# Patient Record
(demographics unavailable — no encounter records)

---

## 2025-03-26 NOTE — PHYSICAL EXAM
[No Respiratory Distress] : no respiratory distress [de-identified] : Pt nonverbal, no acute distress [de-identified] : soft, no tenderness elicited, PEG-J in place, nondistended [General Appearance - In No Acute Distress] : in no acute distress [Sclera] : the sclera and conjunctiva were normal [Neck Cervical Mass (___cm)] : no neck mass was observed [Exaggerated Use Of Accessory Muscles For Inspiration] : no accessory muscle use [Auscultation Breath Sounds / Voice Sounds] : lungs were clear to auscultation bilaterally [Heart Sounds] : normal S1 and S2 [Bowel Sounds] : normal bowel sounds [Abdomen Tenderness] : non-tender [] : no hepato-splenomegaly [FreeTextEntry1] : J-tube and  G-tube noted with clean site [No CVA Tenderness] : no ~M costovertebral angle tenderness [Skin Color & Pigmentation] : normal skin color and pigmentation

## 2025-03-26 NOTE — REVIEW OF SYSTEMS
[Fever] : no fever [Recent Weight Gain (___ Lbs)] : no recent weight gain [Recent Weight Loss (___ Lbs)] : no recent weight loss [Eye Pain] : no eye pain [Earache] : no earache [Heart Rate Is Slow] : the heart rate was not slow [Chest Pain] : no chest pain [Palpitations] : no palpitations [Shortness Of Breath] : no shortness of breath [Cough] : no cough [SOB on Exertion] : no shortness of breath during exertion [Abdominal Pain] : no abdominal pain [Constipation] : no constipation [Diarrhea] : no diarrhea [FreeTextEntry1] : Review of systems is based  on CARE giver input

## 2025-03-26 NOTE — HISTORY OF PRESENT ILLNESS
[de-identified] : 51 year old female with PMH  of cerebral palsy, functional Quadraplegia, seizure disorder, non-verbal at baseline , venting G-tube and J-tube for feeding presented with aid from group [facility] for follow up after a fit test was positive.  She is nonverbal at baseline and the history was provided by the caregiver from the group home.  pt was hospitalized in 11/ 2022 for malfunctioning PEG/J tubes. underwent EGD on 11/30/22 which showed Class A esophagitis, G tube and J tube was replaced. Patient also had colonoscopy for CRC screening on 11/17/22 which was good prep, showing only hemorrhoids and recommendation was to repeat in 10 years. per the Aid, Tolerating feeds well, no vomiting, abdominal distention, diarrhea, constipation, melena, or blood stools.   However her PCP had ordered a FIT testing as part of her annual evaluation which turned out to be positive.   Repeat colonoscopy was normal.  [de-identified] : review of systems is based on aid input

## 2025-03-26 NOTE — ASSESSMENT
[FreeTextEntry1] : 51-year-old female with PMH of cerebral palsy, functional Quadraplegia, seizure disorder, non-verbal at baseline, venting G-tube and J-tube for feeding presented with aid from group home for follow up.   # H/O esophagitis:  - EGD on 7/18/22 which showed Class D esophagitis and NEG ( path: neg for HP). - repeat EGD 11/30/2022: grade A esophagitis - continue with low dose PPI daily for GI ppx given history of esophagitis   # Malfunction G tube and J tube s/p EGD with replacement 11/30/2022 - c/w J-tube feeding as tolerated  - G tube for venting  - slow flush after feeding and meds - care of the tube as routine  # CRC screening: average risk - Colonoscopy done on January 2025. - Repeat 5 years.   Follow-up in 6 months.

## 2025-06-10 NOTE — HISTORY OF PRESENT ILLNESS
[TextBox_4] : 51-year-old, F with PMHx of cerebral palsy, functional quadriplegia, s/p PEG tube, nonverbal at baseline comes for follow up.  The patient was seen previously for a chronic cough, The patient is persistently having a dry cough. No phlegm, no fevers. The patient could not provide for symptoms of post-nasal drip at that time. Further work up showed esophagitis at that time and the patient was started on ppi and is following with GI.  As per the aide today the patient cough improved significantly, and she has no complains today. Last aspiration episode was DEC23 , when they did not open the G tube while feeding through the J.  DIONY answered by aid.

## 2025-06-10 NOTE — REVIEW OF SYSTEMS
[Fever] : no fever [Chills] : no chills [Dry Eyes] : no dry eyes [Epistaxis] : no epistaxis [Nasal Congestion] : no nasal congestion [Mouth Ulcers] : no mouth ulcers [Hemoptysis] : no hemoptysis [Cough] : no cough [Edema] : no edema [Syncope] : no syncope [Watery Eyes] : no watery eyes [Nasal Discharge] : no nasal discharge [Vomiting] : no vomiting [Diarrhea] : no diarrhea

## 2025-06-10 NOTE — ASSESSMENT
[FreeTextEntry1] : 51-year-old, F with PMHx of cerebral palsy, functional quadriplegia, s/p PEG tube, non verbal at baseline comes for follow up.  #Chronic Cough: - was probably due to LPRD given that she improved significantly on ppi - Chest Xray July 2021: normal - not on ACEi, ARBs or Entresto - would recommend continuing the ppi ( konvomep)  - aspirations precautions  - f/u in a year or when needed

## 2025-07-25 NOTE — HISTORY OF PRESENT ILLNESS
[FreeTextEntry1] : Patient presents today c/o bilateral impacted cerumen , clogged ears. Accompanied by aide. Here for routine ear cleaning.

## 2025-07-25 NOTE — PHYSICAL EXAM
[Normal] : mucosa is normal [Midline] : trachea located in midline position [de-identified] : Wax found and cleaned. Cleaning well tolerated by patient. Patient felt subjective improvement in cloginess after cleaning.

## 2025-07-25 NOTE — ASSESSMENT
[FreeTextEntry1] : Wax found and cleaned. Cleaning well tolerated by patient. Patient felt subjective improvement in cloginess after cleaning.